# Patient Record
Sex: MALE | Race: WHITE
[De-identification: names, ages, dates, MRNs, and addresses within clinical notes are randomized per-mention and may not be internally consistent; named-entity substitution may affect disease eponyms.]

---

## 2018-05-17 PROBLEM — B07.9 WARTS OF FOOT: Status: ACTIVE | Noted: 2018-05-17

## 2018-07-05 PROCEDURE — 81003 URINALYSIS AUTO W/O SCOPE: CPT | Performed by: FAMILY MEDICINE

## 2020-01-08 ENCOUNTER — HOSPITAL (OUTPATIENT)
Dept: OTHER | Age: 40
End: 2020-01-08

## 2020-01-08 LAB
ANALYZER ANC (IANC): ABNORMAL
ANION GAP SERPL CALC-SCNC: 7 MMOL/L (ref 10–20)
BASOPHILS # BLD: 0.1 K/MCL (ref 0–0.3)
BASOPHILS NFR BLD: 1 %
BUN SERPL-MCNC: 16 MG/DL (ref 6–20)
BUN/CREAT SERPL: 17 (ref 7–25)
CALCIUM SERPL-MCNC: 9.2 MG/DL (ref 8.4–10.2)
CHLORIDE SERPL-SCNC: 107 MMOL/L (ref 98–107)
CO2 SERPL-SCNC: 29 MMOL/L (ref 21–32)
CREAT SERPL-MCNC: 0.95 MG/DL (ref 0.67–1.17)
DIFFERENTIAL METHOD BLD: ABNORMAL
EOSINOPHIL # BLD: 0.4 K/MCL (ref 0.1–0.5)
EOSINOPHIL NFR BLD: 6 %
ERYTHROCYTE [DISTWIDTH] IN BLOOD: 12.6 % (ref 11–15)
GLUCOSE SERPL-MCNC: 92 MG/DL (ref 65–99)
HCT VFR BLD CALC: 44.4 % (ref 39–51)
HGB BLD-MCNC: 14.9 G/DL (ref 13–17)
IMM GRANULOCYTES # BLD AUTO: 0 K/MCL (ref 0–0.2)
IMM GRANULOCYTES NFR BLD: 0 %
LYMPHOCYTES # BLD: 1.4 K/MCL (ref 1–4.8)
LYMPHOCYTES NFR BLD: 21 %
MAGNESIUM SERPL-MCNC: 2.4 MG/DL (ref 1.7–2.4)
MCH RBC QN AUTO: 30.8 PG (ref 26–34)
MCHC RBC AUTO-ENTMCNC: 33.6 G/DL (ref 32–36.5)
MCV RBC AUTO: 91.7 FL (ref 78–100)
MONOCYTES # BLD: 1.1 K/MCL (ref 0.3–0.9)
MONOCYTES NFR BLD: 16 %
NEUTROPHILS # BLD: 3.8 K/MCL (ref 1.8–7.7)
NEUTROPHILS NFR BLD: 56 %
NEUTS SEG NFR BLD: ABNORMAL %
NRBC (NRBCRE): 0 /100 WBC
PLATELET # BLD: 207 K/MCL (ref 140–450)
POTASSIUM SERPL-SCNC: 3.9 MMOL/L (ref 3.4–5.1)
PROCALCITONIN SERPL IA-MCNC: 0.09 NG/ML
PROCALCITONIN SERPL IA-MCNC: NORMAL NG/ML
RBC # BLD: 4.84 MIL/MCL (ref 4.5–5.9)
SODIUM SERPL-SCNC: 139 MMOL/L (ref 135–145)
WBC # BLD: 6.9 K/MCL (ref 4.2–11)

## 2020-01-12 ENCOUNTER — HOSPITAL ENCOUNTER (INPATIENT)
Facility: HOSPITAL | Age: 40
LOS: 3 days | Discharge: HOME | DRG: 122 | End: 2020-01-15
Attending: EMERGENCY MEDICINE | Admitting: INTERNAL MEDICINE
Payer: COMMERCIAL

## 2020-01-12 ENCOUNTER — APPOINTMENT (OUTPATIENT)
Dept: CT IMAGING | Facility: HOSPITAL | Age: 40
DRG: 122 | End: 2020-01-12
Attending: EMERGENCY MEDICINE
Payer: COMMERCIAL

## 2020-01-12 ENCOUNTER — APPOINTMENT (OUTPATIENT)
Dept: MRI IMAGING | Facility: HOSPITAL | Age: 40
DRG: 122 | End: 2020-01-12
Attending: EMERGENCY MEDICINE
Payer: COMMERCIAL

## 2020-01-12 DIAGNOSIS — H05.011 CELLULITIS OF RIGHT ORBITAL REGION: Primary | ICD-10-CM

## 2020-01-12 LAB
ALBUMIN SERPL-MCNC: 3.9 G/DL (ref 3.4–5)
ALBUMIN/GLOB SERPL: 1.1 {RATIO} (ref 1–2)
ALP LIVER SERPL-CCNC: 51 U/L (ref 45–117)
ALT SERPL-CCNC: 44 U/L (ref 16–61)
ANION GAP SERPL CALC-SCNC: 6 MMOL/L (ref 0–18)
AST SERPL-CCNC: 16 U/L (ref 15–37)
BASOPHILS # BLD AUTO: 0.09 X10(3) UL (ref 0–0.2)
BASOPHILS NFR BLD AUTO: 1.2 %
BILIRUB SERPL-MCNC: 0.3 MG/DL (ref 0.1–2)
BUN BLD-MCNC: 14 MG/DL (ref 7–18)
BUN/CREAT SERPL: 16.3 (ref 10–20)
CALCIUM BLD-MCNC: 9.3 MG/DL (ref 8.5–10.1)
CHLORIDE SERPL-SCNC: 109 MMOL/L (ref 98–112)
CO2 SERPL-SCNC: 27 MMOL/L (ref 21–32)
CREAT BLD-MCNC: 0.86 MG/DL (ref 0.7–1.3)
DEPRECATED RDW RBC AUTO: 41.9 FL (ref 35.1–46.3)
EOSINOPHIL # BLD AUTO: 0.43 X10(3) UL (ref 0–0.7)
EOSINOPHIL NFR BLD AUTO: 5.7 %
ERYTHROCYTE [DISTWIDTH] IN BLOOD BY AUTOMATED COUNT: 12.6 % (ref 11–15)
GLOBULIN PLAS-MCNC: 3.4 G/DL (ref 2.8–4.4)
GLUCOSE BLD-MCNC: 81 MG/DL (ref 70–99)
HCT VFR BLD AUTO: 43.7 % (ref 39–53)
HGB BLD-MCNC: 14.5 G/DL (ref 13–17.5)
IMM GRANULOCYTES # BLD AUTO: 0.02 X10(3) UL (ref 0–1)
IMM GRANULOCYTES NFR BLD: 0.3 %
LYMPHOCYTES # BLD AUTO: 2.48 X10(3) UL (ref 1–4)
LYMPHOCYTES NFR BLD AUTO: 32.8 %
M PROTEIN MFR SERPL ELPH: 7.3 G/DL (ref 6.4–8.2)
MCH RBC QN AUTO: 30.1 PG (ref 26–34)
MCHC RBC AUTO-ENTMCNC: 33.2 G/DL (ref 31–37)
MCV RBC AUTO: 90.9 FL (ref 80–100)
MONOCYTES # BLD AUTO: 0.54 X10(3) UL (ref 0.1–1)
MONOCYTES NFR BLD AUTO: 7.1 %
NEUTROPHILS # BLD AUTO: 4 X10 (3) UL (ref 1.5–7.7)
NEUTROPHILS # BLD AUTO: 4 X10(3) UL (ref 1.5–7.7)
NEUTROPHILS NFR BLD AUTO: 52.9 %
OSMOLALITY SERPL CALC.SUM OF ELEC: 294 MOSM/KG (ref 275–295)
PLATELET # BLD AUTO: 227 10(3)UL (ref 150–450)
POTASSIUM SERPL-SCNC: 3.9 MMOL/L (ref 3.5–5.1)
RBC # BLD AUTO: 4.81 X10(6)UL (ref 4.3–5.7)
SED RATE-ML: 4 MM/HR (ref 0–12)
SODIUM SERPL-SCNC: 142 MMOL/L (ref 136–145)
WBC # BLD AUTO: 7.6 X10(3) UL (ref 4–11)

## 2020-01-12 PROCEDURE — 99285 EMERGENCY DEPT VISIT HI MDM: CPT

## 2020-01-12 PROCEDURE — 96375 TX/PRO/DX INJ NEW DRUG ADDON: CPT

## 2020-01-12 PROCEDURE — A9575 INJ GADOTERATE MEGLUMI 0.1ML: HCPCS

## 2020-01-12 PROCEDURE — 70543 MRI ORBT/FAC/NCK W/O &W/DYE: CPT | Performed by: EMERGENCY MEDICINE

## 2020-01-12 PROCEDURE — 70482 CT ORBIT/EAR/FOSSA W/O&W/DYE: CPT | Performed by: EMERGENCY MEDICINE

## 2020-01-12 PROCEDURE — 96365 THER/PROPH/DIAG IV INF INIT: CPT

## 2020-01-12 PROCEDURE — 80053 COMPREHEN METABOLIC PANEL: CPT | Performed by: EMERGENCY MEDICINE

## 2020-01-12 PROCEDURE — 85652 RBC SED RATE AUTOMATED: CPT | Performed by: EMERGENCY MEDICINE

## 2020-01-12 PROCEDURE — 70553 MRI BRAIN STEM W/O & W/DYE: CPT | Performed by: EMERGENCY MEDICINE

## 2020-01-12 PROCEDURE — 85025 COMPLETE CBC W/AUTO DIFF WBC: CPT | Performed by: EMERGENCY MEDICINE

## 2020-01-12 PROCEDURE — 82787 IGG 1 2 3 OR 4 EACH: CPT | Performed by: OTHER

## 2020-01-12 RX ORDER — HYDROCODONE BITARTRATE AND ACETAMINOPHEN 10; 325 MG/1; MG/1
1 TABLET ORAL EVERY 4 HOURS PRN
Status: DISCONTINUED | OUTPATIENT
Start: 2020-01-12 | End: 2020-01-12

## 2020-01-12 RX ORDER — ONDANSETRON 2 MG/ML
4 INJECTION INTRAMUSCULAR; INTRAVENOUS EVERY 6 HOURS PRN
Status: DISCONTINUED | OUTPATIENT
Start: 2020-01-12 | End: 2020-01-15

## 2020-01-12 RX ORDER — BISACODYL 10 MG
10 SUPPOSITORY, RECTAL RECTAL
Status: DISCONTINUED | OUTPATIENT
Start: 2020-01-12 | End: 2020-01-15

## 2020-01-12 RX ORDER — KETOROLAC TROMETHAMINE 30 MG/ML
30 INJECTION, SOLUTION INTRAMUSCULAR; INTRAVENOUS ONCE
Status: COMPLETED | OUTPATIENT
Start: 2020-01-12 | End: 2020-01-12

## 2020-01-12 RX ORDER — KETOROLAC TROMETHAMINE 30 MG/ML
30 INJECTION, SOLUTION INTRAMUSCULAR; INTRAVENOUS EVERY 6 HOURS PRN
Status: DISPENSED | OUTPATIENT
Start: 2020-01-12 | End: 2020-01-14

## 2020-01-12 RX ORDER — ACETAMINOPHEN 325 MG/1
650 TABLET ORAL EVERY 6 HOURS PRN
Status: DISCONTINUED | OUTPATIENT
Start: 2020-01-12 | End: 2020-01-15

## 2020-01-12 RX ORDER — DOXYCYCLINE HYCLATE 100 MG/1
100 CAPSULE ORAL 2 TIMES DAILY
COMMUNITY
End: 2020-01-29

## 2020-01-12 RX ORDER — HYDROCODONE BITARTRATE AND ACETAMINOPHEN 5; 325 MG/1; MG/1
1 TABLET ORAL EVERY 4 HOURS PRN
Status: DISCONTINUED | OUTPATIENT
Start: 2020-01-12 | End: 2020-01-12

## 2020-01-12 RX ORDER — ENOXAPARIN SODIUM 100 MG/ML
40 INJECTION SUBCUTANEOUS NIGHTLY
Status: DISCONTINUED | OUTPATIENT
Start: 2020-01-12 | End: 2020-01-15

## 2020-01-12 RX ORDER — METOCLOPRAMIDE HYDROCHLORIDE 5 MG/ML
10 INJECTION INTRAMUSCULAR; INTRAVENOUS EVERY 8 HOURS PRN
Status: DISCONTINUED | OUTPATIENT
Start: 2020-01-12 | End: 2020-01-15

## 2020-01-12 RX ORDER — POLYETHYLENE GLYCOL 3350 17 G/17G
17 POWDER, FOR SOLUTION ORAL DAILY PRN
Status: DISCONTINUED | OUTPATIENT
Start: 2020-01-12 | End: 2020-01-15

## 2020-01-12 RX ORDER — TOBRAMYCIN AND DEXAMETHASONE 3; 1 MG/ML; MG/ML
1 SUSPENSION/ DROPS OPHTHALMIC 4 TIMES DAILY
Status: DISCONTINUED | OUTPATIENT
Start: 2020-01-12 | End: 2020-01-15

## 2020-01-13 LAB
ANION GAP SERPL CALC-SCNC: 6 MMOL/L (ref 0–18)
BASOPHILS # BLD AUTO: 0.08 X10(3) UL (ref 0–0.2)
BASOPHILS NFR BLD AUTO: 1.2 %
BUN BLD-MCNC: 14 MG/DL (ref 7–18)
BUN/CREAT SERPL: 15.6 (ref 10–20)
CALCIUM BLD-MCNC: 8.8 MG/DL (ref 8.5–10.1)
CHLORIDE SERPL-SCNC: 110 MMOL/L (ref 98–112)
CO2 SERPL-SCNC: 27 MMOL/L (ref 21–32)
CREAT BLD-MCNC: 0.9 MG/DL (ref 0.7–1.3)
DEPRECATED RDW RBC AUTO: 43.1 FL (ref 35.1–46.3)
EOSINOPHIL # BLD AUTO: 0.38 X10(3) UL (ref 0–0.7)
EOSINOPHIL NFR BLD AUTO: 5.9 %
ERYTHROCYTE [DISTWIDTH] IN BLOOD BY AUTOMATED COUNT: 12.6 % (ref 11–15)
GLUCOSE BLD-MCNC: 90 MG/DL (ref 70–99)
HCT VFR BLD AUTO: 44.1 % (ref 39–53)
HGB BLD-MCNC: 14.7 G/DL (ref 13–17.5)
IMM GRANULOCYTES # BLD AUTO: 0.01 X10(3) UL (ref 0–1)
IMM GRANULOCYTES NFR BLD: 0.2 %
LYMPHOCYTES # BLD AUTO: 2.48 X10(3) UL (ref 1–4)
LYMPHOCYTES NFR BLD AUTO: 38.3 %
MCH RBC QN AUTO: 30.9 PG (ref 26–34)
MCHC RBC AUTO-ENTMCNC: 33.3 G/DL (ref 31–37)
MCV RBC AUTO: 92.8 FL (ref 80–100)
MONOCYTES # BLD AUTO: 0.64 X10(3) UL (ref 0.1–1)
MONOCYTES NFR BLD AUTO: 9.9 %
NEUTROPHILS # BLD AUTO: 2.88 X10 (3) UL (ref 1.5–7.7)
NEUTROPHILS # BLD AUTO: 2.88 X10(3) UL (ref 1.5–7.7)
NEUTROPHILS NFR BLD AUTO: 44.5 %
OSMOLALITY SERPL CALC.SUM OF ELEC: 296 MOSM/KG (ref 275–295)
PLATELET # BLD AUTO: 230 10(3)UL (ref 150–450)
POTASSIUM SERPL-SCNC: 4.3 MMOL/L (ref 3.5–5.1)
RBC # BLD AUTO: 4.75 X10(6)UL (ref 4.3–5.7)
SODIUM SERPL-SCNC: 143 MMOL/L (ref 136–145)
WBC # BLD AUTO: 6.5 X10(3) UL (ref 4–11)

## 2020-01-13 PROCEDURE — 85025 COMPLETE CBC W/AUTO DIFF WBC: CPT | Performed by: HOSPITALIST

## 2020-01-13 PROCEDURE — 80048 BASIC METABOLIC PNL TOTAL CA: CPT | Performed by: HOSPITALIST

## 2020-01-13 RX ORDER — PANTOPRAZOLE SODIUM 40 MG/1
40 TABLET, DELAYED RELEASE ORAL
Status: DISCONTINUED | OUTPATIENT
Start: 2020-01-13 | End: 2020-01-15

## 2020-01-13 RX ORDER — PANTOPRAZOLE SODIUM 40 MG/1
40 TABLET, DELAYED RELEASE ORAL
Status: DISCONTINUED | OUTPATIENT
Start: 2020-01-13 | End: 2020-01-13

## 2020-01-13 NOTE — ED PROVIDER NOTES
Patient Seen in: [facility] 7ne-a      History   Patient presents with:   Eye Visual Problem    Stated Complaint: orbital cellulitis, vision changes    HPI    Patient is a 53-year-old male comes emergency room for evaluation of visual problems from h O2 Device None (Room air)       Current:/68 (BP Location: Left arm)   Pulse 80   Temp 97.7 °F (36.5 °C) (Oral)   Resp 18   Ht 180.3 cm (5' 11\")   Wt 93 kg   SpO2 99%   BMI 28.59 kg/m²     Right Eye Chart Acuity: 20/40, Uncorrected  Left Eye Chart CBC WITH DIFFERENTIAL WITH PLATELET   BASIC METABOLIC PANEL (8)   RAINBOW DRAW BLUE   RAINBOW DRAW LAVENDER   RAINBOW DRAW LIGHT GREEN   RAINBOW DRAW GOLD   CBC W/ DIFFERENTIAL          Ct Orbits (w+wo) (cpt=70482)    Result Date: 1/12/2020  PROCEDURE: be seen with infectious etiology or inflammatory etiology such as pseudotumor. Correlation with clinical history is recommended. Other etiologies should be considered if the swelling does not respond to antibiotics or steroids.    Dictated by: Jose Oneal gland. OPTIC NERVES/CHAISM:      Negative. No mass or abnormal signal. INTRACONAL SPACES:  Negative. Normal retrobulbar fat. EXTRACONAL SPACES:   Enlargement of the like lacrimal gland with increased enhancement. CAVERNOUS SINUS:     Normal appearance. POA    * (Principal) Cellulitis of right orbital region H05.011 1/12/2020 Unknown

## 2020-01-13 NOTE — PLAN OF CARE
Assumed care at 0700  R eyelid edematous. Denies vision changes  C/o mild pain to R eye and HA. Controlled with toradol  IV steroids initiated. Pt c/o facial flushing and feeling warm after administration. Resolved after about an hour.  Denies itching and d

## 2020-01-13 NOTE — CONSULTS
INFECTIOUS DISEASE CONSULT NOTE    Adalberto Paredes Patient Status:  Inpatient    1980 MRN [id number]   [facility] 7NE-A Attending Deepti Desir MD   Hosp Day # 1 PCP Elizabeth Verdugo 1.0 - 2.0 standard drinks of alcohol per week. He reports that he does not use drugs.     Allergies:  No Known Allergies    Medications:    Current Facility-Administered Medications:   •  methylPREDNISolone Sodium Succ (Solu-MEDROL) 500 mg in sodium chlorid bleeding  Musculoskeletal: Negative for myalgias, arthralgias, arthritis. Neurological: Negative for headaches, dizziness, focal neuro deficits  Behavioral/Psych: Negative for anxiety or depression    Physical Exam:    General: No acute distress.  Alert an scanning is performed through the orbits. 3D shaded surface renderings and MPR's are generated on an independent CT scanner workstation. Dose reduction techniques were used.  Dose information is transmitted to the [facility] [employer]) Maame Clayton None.  INDICATIONS:  orbital cellulitis, vision changes  TECHNIQUE:  MRI of the brain was performed with multi-planar T1, T2-weighted images with FLAIR sequences and diffusion weighted images without and with infusion.   MRI of the orbits was performed with the setting of external infection. This would be difficult to distinguish from inflammatory pseudotumor . If there is no response from antibiotics or steroids, the last possibility would be lymphoma test inflammation.   Findings were discussed with Dr. Lajune Rubinstein

## 2020-01-13 NOTE — H&P
[facility] Hospitalist H&P       CC: Patient presents with: Eye Visual Problem       PCP: Amanda Zepeda DO    History of Present Illness:  Mr. Sukhi Moran is a 38 yo male without significant PMH who presented to the ED with right eye vision issues.  Symptoms starte Disorder Father         Aortic valvular disease       Review of Systems  12 point ROS negative, except as stated in HPI    OBJECTIVE:  /66 (BP Location: Left arm)   Pulse 55   Temp 98.1 °F (36.7 °C) (Oral)   Resp 18   Ht 180.3 cm (5' 11\")   Wt 205 l is performed through the orbits. 3D shaded surface renderings and MPR's are generated on an independent CT scanner workstation. Dose reduction techniques were used.  Dose information is transmitted to the [facility] [employer] of Radiology) Maame [address] INDICATIONS:  orbital cellulitis, vision changes  TECHNIQUE:  MRI of the brain was performed with multi-planar T1, T2-weighted images with FLAIR sequences and diffusion weighted images without and with infusion.   MRI of the orbits was performed with thin s setting of external infection. This would be difficult to distinguish from inflammatory pseudotumor . If there is no response from antibiotics or steroids, the last possibility would be lymphoma test inflammation.   Findings were discussed with Dr. Jere Paniagua

## 2020-01-13 NOTE — PLAN OF CARE
Assumed care of pt at 2100. Pt AOx4. RA.  .  NSR. Pt c/o pain 2/10 to right eye orbit. Right eye orbit swollen. Toradol IV given in ER for relief. CT and MRI completed. IV ABX started and tobramycin eye gtts. Will continue to monitor.

## 2020-01-13 NOTE — CONSULTS
Neurology consult NOTE    The reason for consultation:    Left eye pain and ptosis. HPI:   Patient is a 49-year-old male who was admitted due to right eyelid droop, swelling and pain.  Patient complained of some mild upper eyelid swelling and was seen ini file      Transportation needs:        Medical: Not on file        Non-medical: Not on file    Tobacco Use      Smoking status: Never Smoker      Smokeless tobacco: Never Used    Substance and Sexual Activity      Alcohol use:  Yes        Alcohol/week: 1.0 CARDIOVASCULAR: denies chest pain or RAM; no palpitations   GI: denies nausea, vomiting, constipation, diarrhea; no rectal bleeding; no heartburn.   MUSCULOSKELETAL: no joint complaints upper or lower extremities  PSYCHE:no depression or anxiety  NEURO: m BUN 14 01/12/2020    BUN 20 07/05/2018     Lab Results   Component Value Date    CREATSERUM 0.90 01/13/2020    CREATSERUM 0.86 01/12/2020    CREATSERUM 0.87 07/05/2018     CBC:    Lab Results   Component Value Date    WBC 6.5 01/13/2020    WBC 7.6 01/12/20 orbital inflammatory syndrome favored than infection. Other ddx including not limiting ocular MG, Ig G4 disease. PLAN:     1. Start on IV solumedrol 500mg q 12 hrs and see if helping his symptoms. 2. Continue antibiotics per ID.    3. Pending MG and

## 2020-01-14 LAB
ANION GAP SERPL CALC-SCNC: 9 MMOL/L (ref 0–18)
BUN BLD-MCNC: 13 MG/DL (ref 7–18)
BUN/CREAT SERPL: 14.1 (ref 10–20)
CALCIUM BLD-MCNC: 8.9 MG/DL (ref 8.5–10.1)
CHLORIDE SERPL-SCNC: 108 MMOL/L (ref 98–112)
CO2 SERPL-SCNC: 24 MMOL/L (ref 21–32)
CREAT BLD-MCNC: 0.92 MG/DL (ref 0.7–1.3)
GLUCOSE BLD-MCNC: 168 MG/DL (ref 70–99)
OSMOLALITY SERPL CALC.SUM OF ELEC: 296 MOSM/KG (ref 275–295)
POTASSIUM SERPL-SCNC: 4.3 MMOL/L (ref 3.5–5.1)
SODIUM SERPL-SCNC: 141 MMOL/L (ref 136–145)

## 2020-01-14 PROCEDURE — 80048 BASIC METABOLIC PNL TOTAL CA: CPT | Performed by: INTERNAL MEDICINE

## 2020-01-14 NOTE — PLAN OF CARE
Patient alert and oriented times four. Meds given per MAR. New IV placed due to incompatibility between IV medications. Vital signs stable. Up ad kaye. Eye drops administered per orders. PRN Toradol for pain. Resting comfortably in bed. Call light in reach.

## 2020-01-14 NOTE — CM/SW NOTE
Care coordination note    Ophthalmology consult order was placed on 1/12/20. Call placed to Dr Denilson Roland [phone]. Spoke to the office, alerting them to the consult order & request to see the pt today. The message will be sent to the physician.  CM contac

## 2020-01-14 NOTE — PROGRESS NOTES
INFECTIOUS DISEASE PROGRESS NOTE    Renata Pang Patient Status:  Inpatient    1980 MRN [id number]   [facility] 7NE-A Attending Ralph Dejesus MD   Hosp Day # 2 PCP Ellena Kawasaki scab on occipital area at site of cyst resection. No signs of infection  Neck: No lymphadenopathy. Supple. Respiratory: Clear to auscultation bilaterally. No wheezes. No rhonchi. Cardiovascular: S1, S2.  Regular rate and rhythm. No murmurs.   Abdomen: 350  FINDINGS:  GLOBES:  There is mild right eye proptosis. The globe is unremarkable. EXTRAOCULAR MUSCLES:  There is enlargement of the superior rectus muscle with extension to the lacrimal gland which is better seen on the MRI.  INTRACONAL SPACE:  No vi Dotarem  FINDINGS:  MRI BRAIN INTRACRANIAL:  There are no focal parenchymal brain abnormalities. Diffusion weighted imaging was performed and is unremarkable. There is no evidence for acute infarction. There is no evidence of hemorrhage or mass lesion. steroids seem more c/w orbital pseudotumor or idiopathic orbital inflammatory syndrome rather than an infection. - no cellulitis on exam, is afebrile and w/o other systemic signs of infection. No acute sinusitis per imaging.  Based on above will d/c unasy

## 2020-01-14 NOTE — PROGRESS NOTES
Neurology follow up NOTE    SUBJECTIVE:  He has received IV solumedrol 1000mg and felt much better, he did not sleep well last night and had mild diffuse headaches. No blurry vision, no eye pain.      OBJECTIVE:   /88 (BP Location: Left arm)   Pulse 9 Stance/Romberg: Neg. SPEECH:     Articulation: NL     Rhythm: NL    REFLEXES:     RUE: 2+/4     RLE: 2+/4     LUE: 2+/4     LLE: 2+/4     ASSESSMENT:   39 presented with right eye selling, ptosis and pain.  MRI of orbit did show muscle enhancement, infec

## 2020-01-14 NOTE — PAYOR COMM NOTE
--------------  CONTINUED STAY REVIEW    Payor: [employer] #:  [id number]  Authorization Number: [id number]    Admit date: 1/12/20  Admit time: 2038    Admitting Physician: Marthenia Goodell, MD  Attending Physician:  Jeffry Faustin 1/14/2020 1315 Given 1 drop Right Eye Adam Awan RN    1/14/2020 0694 Given 1 drop Right Eye Adam Awan RN    1/13/2020 2035 Given 1 drop Right Eye Anup Shannon RN    1/13/2020 1745 Given 1 drop Right Eye Timothy Clement RN          Andreina Hearing: NL     Palate: NL     SCM/Trapezius: 5/5     Tongue in the middle     MOTOR FUNCTION:     Tone: NL     Bulk: NL     Strength: NL     Abnormal Movement: None     SENSORY FUNCTION:     Pin Prick: NL     Temperature: NL     Soft Touch: NL     Vibr

## 2020-01-14 NOTE — PAYOR COMM NOTE
--------------  ADMISSION REVIEW     Payor: Lata Teresa #:  [id number]  Authorization Number: [id number]    Admit date: 1/12/20  Admit time: 2038       Admitting Physician: Ambrose Walker MD  Attending Physician:  Adelita Grady MD for stated complaint: orbital cellulitis, vision changes  Other systems are as noted in HPI. Constitutional and vital signs reviewed. All other systems reviewed and negative except as noted above.     Physical Exam     ED Triage Vitals [01/12/20 1611] DIFFERENTIAL      Ct Orbits (w+wo) (cpt=70482)  Result Date: 1/12/2020  PROCEDURE:  CT ORBITS (W+WO) (CPT=70482)  COMPARISON:  [facility] , MR, MRI BRAIN+ORBITS (ALL W+WO) (CPT=70553/65109), 1/12/2020, 17:50.   INDICATIONS:  orbital cellulitis, vision changes the swelling does not respond to antibiotics or steroids. Mri Brain+orbits (all W+wo) (NOP=94470/29289)  Result Date: 1/12/2020  PROCEDURE:  MRI BRAIN+ORBITS (ALL W+WO) (CPT=70553/82073)  COMPARISON:  None.   INDICATIONS:  orbital cellulitis, visio not involve the cavernous sinus. CONCLUSION:   1. Enlargement and avid enhancement of the right superior rectus muscle and lacrimal gland. The differential diagnosis would include orbital cellulitis in the setting of external infection.   This would be Illness:  Mr. Valerie Carrillo is a 36 yo male without significant PMH who presented to the ED with right eye vision issues. Symptoms started last week, initially noted to have some swelling in the right upper eyelid.  He initially presented to the ED at [facility] 114/66  01/09/20 : 122/64  12/16/19 : 118/72    Wt Readings from Last 3 Encounters:  01/12/20 : 205 lb (93 kg)  01/12/20 : 205 lb (93 kg)  01/09/20 : 200 lb (90.7 kg)      Wt Readings from Last 6 Encounters:  01/12/20 : 205 lb (93 kg)  01/12/20 : 205 lb (9 PATIENT STATED HISTORY:(As transcribed by Technologist)  Patient complains of swelling around his right eye with blurry vision. CONTRAST USED:  74cc of Omnipaque 350  FINDINGS:  GLOBES:  There is mild right eye proptosis. The globe is unremarkable.   EX since last Tuesday. CONTRAST USED:  20 mL of Dotarem  FINDINGS:  MRI BRAIN INTRACRANIAL:  There are no focal parenchymal brain abnormalities. Diffusion weighted imaging was performed and is unremarkable. There is no evidence for acute infarction.   Ther and Swelling  - Concern for possible orbital cellulitis vs possible pseudotumor  - MRI orbit and CT orbit reviewed -- concern for orbital cellulitis vs possible pseudotumor  - Discussed with Dr. Darby Camacho and Dr. Jenn Patel -- more concern for inflammatory pseudotu respond to steroids. Will also cont empiric unasyn for now. Case and plan d/w Dr Ely Nice and with pt. Thank you for allowing me to participate in the care of this patient. Please do not hesitate to call if you have any questions.    I will continue to follo Route User    1/13/2020 1006 Given 40 mg Oral Andreina Fletcher RN      tobramycin-dexamethasone (TOBRADEX) 0.3-0.1 % ophthalmic suspension 1 drop     Date Action Dose Route User    1/13/2020 1745 Given 1 drop Right Eye Tyler Mar RN    1/13/2020 6631

## 2020-01-14 NOTE — PROGRESS NOTES
[facility] Hospitalist Progress Note     [facility]      SUBJECTIVE:  Feeling much better today   Eye swelling better    OBJECTIVE:  Temp:  [97.6 °F (36.4 °C)-98.3 °F (36.8 °C)] 97.8 °F (36.6 °C)  Pulse:  [68-94] 94  Resp:  [16-20] 18  BP: (108-145)/(60-88 HISTORY:(As transcribed by Technologist)  Patient complains of swelling around his right eye with blurry vision. CONTRAST USED:  74cc of Omnipaque 350  FINDINGS:  GLOBES:  There is mild right eye proptosis. The globe is unremarkable.   EXTRAOCULAR MUSCLE transcribed by Technologist)  Patient presents to the ER with pain and swelling to the right eye since last Tuesday. CONTRAST USED:  20 mL of Dotarem  FINDINGS:  MRI BRAIN INTRACRANIAL:  There are no focal parenchymal brain abnormalities.   Diffusion weig Approved by: Angella Landeros MD on 1/12/2020 at 19:16             Meds:   No current outpatient medications on file.       methylPREDNISolone Sodium Succ (Solu-MEDROL) 500 mg in sodium chloride 0.9% 100 mL IVPB, 500 mg, Intravenous, Once  [START ON 1/15/2020] afternoon     Prophy:  DVT: lovenox  Deconditioning prevention: PT     Dispo: admitted for right eye ptosis     Updated patient and wife at bedside on plan of care, also discussed with RN [address]  Internal Medicine  [facility]

## 2020-01-15 VITALS
HEIGHT: 71 IN | HEART RATE: 80 BPM | OXYGEN SATURATION: 97 % | TEMPERATURE: 98 F | BODY MASS INDEX: 28.7 KG/M2 | RESPIRATION RATE: 16 BRPM | DIASTOLIC BLOOD PRESSURE: 67 MMHG | WEIGHT: 205 LBS | SYSTOLIC BLOOD PRESSURE: 124 MMHG

## 2020-01-15 LAB
ANION GAP SERPL CALC-SCNC: 2 MMOL/L (ref 0–18)
BUN BLD-MCNC: 18 MG/DL (ref 7–18)
BUN/CREAT SERPL: 21.4 (ref 10–20)
CALCIUM BLD-MCNC: 8.6 MG/DL (ref 8.5–10.1)
CHLORIDE SERPL-SCNC: 110 MMOL/L (ref 98–112)
CO2 SERPL-SCNC: 29 MMOL/L (ref 21–32)
CREAT BLD-MCNC: 0.84 MG/DL (ref 0.7–1.3)
GLUCOSE BLD-MCNC: 144 MG/DL (ref 70–99)
IMMUNOGLOBULIN G SUBCLASS 1: 518 MG/DL
IMMUNOGLOBULIN G SUBCLASS 2: 266 MG/DL
IMMUNOGLOBULIN G SUBCLASS 3: 22 MG/DL
IMMUNOGLOBULIN G SUBCLASS 4: 51 MG/DL
OSMOLALITY SERPL CALC.SUM OF ELEC: 296 MOSM/KG (ref 275–295)
POTASSIUM SERPL-SCNC: 4.4 MMOL/L (ref 3.5–5.1)
SODIUM SERPL-SCNC: 141 MMOL/L (ref 136–145)

## 2020-01-15 PROCEDURE — 80048 BASIC METABOLIC PNL TOTAL CA: CPT | Performed by: INTERNAL MEDICINE

## 2020-01-15 RX ORDER — PREDNISONE 20 MG/1
80 TABLET ORAL DAILY
Qty: 28 TABLET | Refills: 0 | Status: SHIPPED | OUTPATIENT
Start: 2020-01-15 | End: 2021-01-04 | Stop reason: ALTCHOICE

## 2020-01-15 RX ORDER — PREDNISONE 20 MG/1
TABLET ORAL
Qty: 30 TABLET | Refills: 0 | Status: SHIPPED | OUTPATIENT
Start: 2020-01-15 | End: 2020-01-15

## 2020-01-15 RX ORDER — PANTOPRAZOLE SODIUM 40 MG/1
40 TABLET, DELAYED RELEASE ORAL
Qty: 30 TABLET | Refills: 0 | Status: SHIPPED | OUTPATIENT
Start: 2020-01-16 | End: 2021-01-04 | Stop reason: ALTCHOICE

## 2020-01-15 NOTE — PROGRESS NOTES
Neurology follow up NOTE    SUBJECTIVE:  Eye pain and swelling continue improving.      OBJECTIVE:   /67 (BP Location: Left arm)   Pulse 80   Temp 97.8 °F (36.6 °C) (Oral)   Resp 16   Ht 5' 11\" (1.803 m)   Wt 205 lb (93 kg)   SpO2 97%   BMI 28.59 kg/ LUE: 2+/4     LLE: 2+/4     ASSESSMENT:   39 presented with right eye selling, ptosis and pain. MRI of orbit did show muscle enhancement, infection vs inflammation.  Ithe orbital pseudotumor or idiopathic orbital inflammatory syndrome favored than infectio

## 2020-01-15 NOTE — PROGRESS NOTES
INFECTIOUS DISEASE PROGRESS NOTE    Renata Pang Patient Status:  Inpatient    1980 MRN [id number]   [facility] 7NE-A Attending Ralph Dejesus MD   Hosp Day # 3 PCP Ellena Kawasaki murmurs. Abdomen: Soft, nontender, nondistended. Positive bowel sounds. Musculoskeletal: Full range of motion of all extremities. No swelling noted. Integument: No lesions. No erythema. No open wounds.     Laboratory Data:    Recent Labs   Lab 01/12/20 rectus muscle with extension to the lacrimal gland which is better seen on the MRI. INTRACONAL SPACE:  No visible mass, with normal retrobulbar fat. EXTRACONAL SPACE:  No visible mass. SINUSES:  No significant mucosal thickening or fluid.   Mucous retenti unremarkable. There is no evidence for acute infarction. There is no evidence of hemorrhage or mass lesion. VENTRICLES/SULCI:   Ventricles and sulci are normal in caliber. There are no extra-axial fluid collections. There is no midline shift.  SINUSES: afebrile and w/o other systemic signs of infection. No acute sinusitis per imaging. He cont to improve off abx  - cont steroids per neuro and ophthalmology  - He will need f/u with ophthalmology as outpt for further management. Case and plan d/w pt.  Josesito Culver

## 2020-01-15 NOTE — DISCHARGE SUMMARY
General Medicine Discharge Summary     Patient ID:  Fabi Paz  37year old  4/11/1980    Admit date: 1/12/2020    Discharge date and time: 1/15/2020  1:21 PM     Attending Physician: Mariama Wilkerson MD    Primary Care Physician: Toan Weinstein DO Suspension  Place 1 drop into the right eye 4 (four) times daily for 10 days. , Normal, Disp-1 Bottle, R-0        Follow-up with   PCP in 1 week  Ophthalmology tomorrow  Neurology in 1 month     Total Time Coordinating Care: Greater than 30 minutes    Patie

## 2020-01-15 NOTE — PLAN OF CARE
Assumed care of patient at 1900. Alert and oriented x4. No complaints of pain at this time. Minimal swelling around right eye. Room air with clear lung sounds. Ambulatory in room. VSS. Tylenol for pain if needed.  Call light within reach, will continue to m

## 2020-01-15 NOTE — PLAN OF CARE
Problem: Patient/Family Goals  Goal: Patient/Family Long Term Goal  Description  Patient's Long Term Goal: Keep inflammation down    Interventions:  - Take tapering dose of medication  - See additional Care Plan goals for specific interventions  Outcome:

## 2020-01-15 NOTE — PLAN OF CARE
NURSING DISCHARGE NOTE    Discharged Home via Ambulatory. Accompanied by Support staff  Belongings Taken by patient/family. Patient was discharged to home with new prescriptions. Discussed follow up care and new medications. Stated understanding.  I

## 2020-01-17 NOTE — PAYOR COMM NOTE
--------------  DISCHARGE REVIEW    Payor: Janes Collier #:  [id number]  Authorization Number: [id number]    Admit date: 1/12/20  Admit time:  2038  Discharge Date: 1/15/2020  1:21 PM     Admitting Physician: Danita Ibarra MD  Attendi afternoon    Consults: IP CONSULT TO INFECTIOUS DISEASE    Operative Procedures:      Disposition: home    Patient Instructions:   Discharge Medication List as of 1/15/2020 11:43 AM    START taking these medications    Pantoprazole Sodium 40 MG Oral Tab EC

## 2020-01-29 PROBLEM — H05.011 CELLULITIS OF RIGHT ORBITAL REGION: Status: RESOLVED | Noted: 2020-01-12 | Resolved: 2020-01-29

## 2021-09-07 ENCOUNTER — TELEPHONE (OUTPATIENT)
Dept: ALLERGY | Facility: CLINIC | Age: 41
End: 2021-09-07

## 2021-09-07 NOTE — TELEPHONE ENCOUNTER
Erroneous encounter. Magdalena Thapa is not a patient of Dr. Jigna Ocampo. He was reaching out on a professional level.

## 2021-09-07 NOTE — TELEPHONE ENCOUNTER
This pt states that he has spoke with Dr Rosi Goodman in the past and that he is a . Pt states he usually calls our number to set up an appt with . He states there is a New therapy treatment that has been published and states that  wanted him to reac

## 2021-09-29 PROBLEM — H18.609: Status: ACTIVE | Noted: 2021-09-29

## 2021-11-01 NOTE — ED INITIAL ASSESSMENT (HPI)
Pt c/o of swelling and pain to R eye x 6 days. Was seen in ED and given tobramycin drops and taking oral antibiotics Pt states no improvement. Wife if a hospitalist here and advised him to come in to be further evaluated.  Pt states he has had blurriness to - - -

## 2022-11-28 ENCOUNTER — LAB ENCOUNTER (OUTPATIENT)
Dept: LAB | Age: 42
End: 2022-11-28
Attending: OTOLARYNGOLOGY
Payer: COMMERCIAL

## 2022-11-28 DIAGNOSIS — Z20.822 ENCOUNTER FOR PREPROCEDURE SCREENING LABORATORY TESTING FOR COVID-19: ICD-10-CM

## 2022-11-28 DIAGNOSIS — Z01.812 ENCOUNTER FOR PREPROCEDURE SCREENING LABORATORY TESTING FOR COVID-19: ICD-10-CM

## 2022-11-28 RX ORDER — FAMOTIDINE 40 MG/1
40 TABLET, FILM COATED ORAL DAILY
COMMUNITY

## 2022-11-29 ENCOUNTER — ANESTHESIA EVENT (OUTPATIENT)
Dept: SURGERY | Facility: HOSPITAL | Age: 42
End: 2022-11-29
Payer: COMMERCIAL

## 2022-11-29 LAB — SARS-COV-2 RNA RESP QL NAA+PROBE: NOT DETECTED

## 2022-11-30 ENCOUNTER — HOSPITAL ENCOUNTER (OUTPATIENT)
Facility: HOSPITAL | Age: 42
Setting detail: HOSPITAL OUTPATIENT SURGERY
Discharge: HOME | End: 2022-11-30
Attending: OTOLARYNGOLOGY | Admitting: OTOLARYNGOLOGY
Payer: COMMERCIAL

## 2022-11-30 ENCOUNTER — ANESTHESIA (OUTPATIENT)
Dept: SURGERY | Facility: HOSPITAL | Age: 42
End: 2022-11-30
Payer: COMMERCIAL

## 2022-11-30 VITALS
DIASTOLIC BLOOD PRESSURE: 90 MMHG | OXYGEN SATURATION: 97 % | WEIGHT: 195 LBS | RESPIRATION RATE: 16 BRPM | TEMPERATURE: 97 F | HEIGHT: 71 IN | BODY MASS INDEX: 27.3 KG/M2 | SYSTOLIC BLOOD PRESSURE: 143 MMHG | HEART RATE: 75 BPM

## 2022-11-30 DIAGNOSIS — Z01.812 ENCOUNTER FOR PREPROCEDURE SCREENING LABORATORY TESTING FOR COVID-19: Primary | ICD-10-CM

## 2022-11-30 DIAGNOSIS — Z20.822 ENCOUNTER FOR PREPROCEDURE SCREENING LABORATORY TESTING FOR COVID-19: Primary | ICD-10-CM

## 2022-11-30 PROCEDURE — 88311 DECALCIFY TISSUE: CPT | Performed by: OTOLARYNGOLOGY

## 2022-11-30 PROCEDURE — 099R8ZZ DRAINAGE OF LEFT MAXILLARY SINUS, VIA NATURAL OR ARTIFICIAL OPENING ENDOSCOPIC: ICD-10-PCS | Performed by: OTOLARYNGOLOGY

## 2022-11-30 PROCEDURE — 88304 TISSUE EXAM BY PATHOLOGIST: CPT | Performed by: OTOLARYNGOLOGY

## 2022-11-30 PROCEDURE — 099Q8ZZ DRAINAGE OF RIGHT MAXILLARY SINUS, VIA NATURAL OR ARTIFICIAL OPENING ENDOSCOPIC: ICD-10-PCS | Performed by: OTOLARYNGOLOGY

## 2022-11-30 PROCEDURE — 09TL8ZZ RESECTION OF NASAL TURBINATE, VIA NATURAL OR ARTIFICIAL OPENING ENDOSCOPIC: ICD-10-PCS | Performed by: OTOLARYNGOLOGY

## 2022-11-30 RX ORDER — SODIUM CHLORIDE, SODIUM LACTATE, POTASSIUM CHLORIDE, CALCIUM CHLORIDE 600; 310; 30; 20 MG/100ML; MG/100ML; MG/100ML; MG/100ML
INJECTION, SOLUTION INTRAVENOUS CONTINUOUS
Status: DISCONTINUED | OUTPATIENT
Start: 2022-11-30 | End: 2022-11-30

## 2022-11-30 RX ORDER — HYDROMORPHONE HYDROCHLORIDE 1 MG/ML
0.6 INJECTION, SOLUTION INTRAMUSCULAR; INTRAVENOUS; SUBCUTANEOUS EVERY 5 MIN PRN
Status: DISCONTINUED | OUTPATIENT
Start: 2022-11-30 | End: 2022-11-30

## 2022-11-30 RX ORDER — ACETAMINOPHEN 500 MG
1000 TABLET ORAL ONCE
Status: DISCONTINUED | OUTPATIENT
Start: 2022-11-30 | End: 2022-11-30 | Stop reason: HOSPADM

## 2022-11-30 RX ORDER — HYDROMORPHONE HYDROCHLORIDE 1 MG/ML
0.2 INJECTION, SOLUTION INTRAMUSCULAR; INTRAVENOUS; SUBCUTANEOUS EVERY 5 MIN PRN
Status: DISCONTINUED | OUTPATIENT
Start: 2022-11-30 | End: 2022-11-30

## 2022-11-30 RX ORDER — AZELASTINE HYDROCHLORIDE 137 UG/1
SPRAY, METERED NASAL
COMMUNITY
Start: 2022-11-27

## 2022-11-30 RX ORDER — ONDANSETRON 2 MG/ML
4 INJECTION INTRAMUSCULAR; INTRAVENOUS EVERY 6 HOURS PRN
Status: DISCONTINUED | OUTPATIENT
Start: 2022-11-30 | End: 2022-11-30

## 2022-11-30 RX ORDER — OXYMETAZOLINE HYDROCHLORIDE 0.05 G/100ML
SPRAY NASAL AS NEEDED
Status: DISCONTINUED | OUTPATIENT
Start: 2022-11-30 | End: 2022-11-30 | Stop reason: HOSPADM

## 2022-11-30 RX ORDER — HYDROCODONE BITARTRATE AND ACETAMINOPHEN 5; 325 MG/1; MG/1
2 TABLET ORAL ONCE AS NEEDED
Status: DISCONTINUED | OUTPATIENT
Start: 2022-11-30 | End: 2022-11-30

## 2022-11-30 RX ORDER — DEXAMETHASONE SODIUM PHOSPHATE 4 MG/ML
VIAL (ML) INJECTION AS NEEDED
Status: DISCONTINUED | OUTPATIENT
Start: 2022-11-30 | End: 2022-11-30 | Stop reason: SURG

## 2022-11-30 RX ORDER — HYDROMORPHONE HYDROCHLORIDE 1 MG/ML
0.4 INJECTION, SOLUTION INTRAMUSCULAR; INTRAVENOUS; SUBCUTANEOUS EVERY 5 MIN PRN
Status: DISCONTINUED | OUTPATIENT
Start: 2022-11-30 | End: 2022-11-30

## 2022-11-30 RX ORDER — SCOLOPAMINE TRANSDERMAL SYSTEM 1 MG/1
1 PATCH, EXTENDED RELEASE TRANSDERMAL ONCE
Status: DISCONTINUED | OUTPATIENT
Start: 2022-11-30 | End: 2022-11-30 | Stop reason: HOSPADM

## 2022-11-30 RX ORDER — HYDROCODONE BITARTRATE AND ACETAMINOPHEN 5; 325 MG/1; MG/1
1 TABLET ORAL ONCE AS NEEDED
Status: DISCONTINUED | OUTPATIENT
Start: 2022-11-30 | End: 2022-11-30

## 2022-11-30 RX ORDER — MIDAZOLAM HYDROCHLORIDE 1 MG/ML
1 INJECTION INTRAMUSCULAR; INTRAVENOUS EVERY 5 MIN PRN
Status: DISCONTINUED | OUTPATIENT
Start: 2022-11-30 | End: 2022-11-30

## 2022-11-30 RX ORDER — ACETAMINOPHEN 500 MG
1000 TABLET ORAL ONCE AS NEEDED
Status: DISCONTINUED | OUTPATIENT
Start: 2022-11-30 | End: 2022-11-30

## 2022-11-30 RX ORDER — LIDOCAINE HYDROCHLORIDE AND EPINEPHRINE 10; 10 MG/ML; UG/ML
INJECTION, SOLUTION INFILTRATION; PERINEURAL AS NEEDED
Status: DISCONTINUED | OUTPATIENT
Start: 2022-11-30 | End: 2022-11-30 | Stop reason: HOSPADM

## 2022-11-30 RX ORDER — LIDOCAINE HYDROCHLORIDE 10 MG/ML
INJECTION, SOLUTION EPIDURAL; INFILTRATION; INTRACAUDAL; PERINEURAL AS NEEDED
Status: DISCONTINUED | OUTPATIENT
Start: 2022-11-30 | End: 2022-11-30 | Stop reason: SURG

## 2022-11-30 RX ORDER — ROCURONIUM BROMIDE 10 MG/ML
INJECTION, SOLUTION INTRAVENOUS AS NEEDED
Status: DISCONTINUED | OUTPATIENT
Start: 2022-11-30 | End: 2022-11-30 | Stop reason: SURG

## 2022-11-30 RX ORDER — ONDANSETRON 2 MG/ML
INJECTION INTRAMUSCULAR; INTRAVENOUS AS NEEDED
Status: DISCONTINUED | OUTPATIENT
Start: 2022-11-30 | End: 2022-11-30 | Stop reason: SURG

## 2022-11-30 RX ORDER — PROCHLORPERAZINE EDISYLATE 5 MG/ML
5 INJECTION INTRAMUSCULAR; INTRAVENOUS EVERY 8 HOURS PRN
Status: DISCONTINUED | OUTPATIENT
Start: 2022-11-30 | End: 2022-11-30

## 2022-11-30 RX ORDER — NALOXONE HYDROCHLORIDE 0.4 MG/ML
80 INJECTION, SOLUTION INTRAMUSCULAR; INTRAVENOUS; SUBCUTANEOUS AS NEEDED
Status: DISCONTINUED | OUTPATIENT
Start: 2022-11-30 | End: 2022-11-30

## 2022-11-30 RX ORDER — KETOROLAC TROMETHAMINE 30 MG/ML
INJECTION, SOLUTION INTRAMUSCULAR; INTRAVENOUS AS NEEDED
Status: DISCONTINUED | OUTPATIENT
Start: 2022-11-30 | End: 2022-11-30 | Stop reason: SURG

## 2022-11-30 RX ADMIN — ROCURONIUM BROMIDE 40 MG: 10 INJECTION, SOLUTION INTRAVENOUS at 11:09:00

## 2022-11-30 RX ADMIN — SODIUM CHLORIDE, SODIUM LACTATE, POTASSIUM CHLORIDE, CALCIUM CHLORIDE: 600; 310; 30; 20 INJECTION, SOLUTION INTRAVENOUS at 11:54:00

## 2022-11-30 RX ADMIN — SODIUM CHLORIDE, SODIUM LACTATE, POTASSIUM CHLORIDE, CALCIUM CHLORIDE: 600; 310; 30; 20 INJECTION, SOLUTION INTRAVENOUS at 11:06:00

## 2022-11-30 RX ADMIN — ONDANSETRON 4 MG: 2 INJECTION INTRAMUSCULAR; INTRAVENOUS at 11:40:00

## 2022-11-30 RX ADMIN — KETOROLAC TROMETHAMINE 30 MG: 30 INJECTION, SOLUTION INTRAMUSCULAR; INTRAVENOUS at 11:40:00

## 2022-11-30 RX ADMIN — DEXAMETHASONE SODIUM PHOSPHATE 10 MG: 4 MG/ML VIAL (ML) INJECTION at 11:21:00

## 2022-11-30 RX ADMIN — LIDOCAINE HYDROCHLORIDE 100 MG: 10 INJECTION, SOLUTION EPIDURAL; INFILTRATION; INTRACAUDAL; PERINEURAL at 11:09:00

## 2022-11-30 NOTE — DISCHARGE INSTRUCTIONS
Ok to blow nose   Use saline 3 times per day   And tylenol or motrin for pain    No diet restriction     You received a drug called Toradol which is an Anti Inflammatory at: 11:40AM  If you are allowed to take Anti Inflammatories:    Do not take any Anti Inflammatory like Motrin, Aleve, or Ibuprofen until after: 5:40PM    Please report any suspected allergic reactions or bleeding issues to your doctor

## 2022-11-30 NOTE — ANESTHESIA POSTPROCEDURE EVALUATION
[address] Patient Status:  Hospital Outpatient Surgery   Age/Gender 38year old male MRN [id number]   [facility] SURGERY Attending Kacey Robles MD   Hosp Day # 0 PCP Amy Chow DO       Anesthesia Post-op Note    BILATERAL MAXILLARY ANTROSTOMY AND BILATERAL TURBINATE REDUCTION    Procedure Summary     Date: 11/30/22 Room / Location: [facility] MAIN OR 02 / [facility] MAIN OR    Anesthesia Start: 8156 Anesthesia Stop:     Procedure: BILATERAL MAXILLARY ANTROSTOMY AND BILATERAL TURBINATE REDUCTION (Bilateral: Nose) Diagnosis: (NASAL TURBINATE HYPERTROPHY; CHRONIC MAXILLARY SINUSITIS; SEASONAL ALLERGIC RHINITIS DUE TO POLLEN)    Surgeons: Kacey Robles MD Anesthesiologist: Amy Madrigal MD    Anesthesia Type: general ASA Status: 1          Anesthesia Type: general    Vitals Value Taken Time   /90 11/30/22 1154   Temp 97.5 11/30/22 1154   Pulse 82 11/30/22 1154   Resp 20 11/30/22 1154   SpO2 100 11/30/22 1154       Patient Location: PACU    Anesthesia Type: general    Airway Patency: patent    Postop Pain Control: adequate    Mental Status: mildly sedated but able to meaningfully participate in the post-anesthesia evaluation    Nausea/Vomiting: none    Cardiopulmonary/Hydration status: stable euvolemic    Complications: no apparent anesthesia related complications    Postop vital signs: stable    Comments: signout given to PACU DUANE Mckeon    Dental Exam: Unchanged from Preop    Patient to be discharged from PACU when criteria met.

## 2022-11-30 NOTE — ANESTHESIA PROCEDURE NOTES
Airway  Date/Time: 11/30/2022 11:12 AM  Urgency: elective    Airway not difficult    General Information and Staff    Patient location during procedure: OR  Anesthesiologist: Davion Nazario MD  Performed: anesthesiologist     Indications and Patient Condition  Indications for airway management: anesthesia  Spontaneous Ventilation: absent  Sedation level: deep  Preoxygenated: yes  Patient position: sniffing  Mask difficulty assessment: 1 - vent by mask    Final Airway Details  Final airway type: endotracheal airway      Successful airway: ETT  Cuffed: yes   Successful intubation technique: direct laryngoscopy  Facilitating devices/methods: intubating stylet and cricoid pressure  Endotracheal tube insertion site: oral  Blade: Roger  Blade size: #3  ETT size (mm): 7.5    Cormack-Lehane Classification: grade I - full view of glottis  Placement verified by: chest auscultation and capnometry   Measured from: lips  Number of attempts at approach: 1  Number of other approaches attempted: 0    Additional Comments  Smooth induction. Eyes taped after induction, prior to intubation. Uncomplicated, successful intubation. Dentition same as previous, atraumatic.

## 2022-11-30 NOTE — BRIEF OP NOTE
Pre-Operative Diagnosis: NASAL TURBINATE HYPERTROPHY; CHRONIC MAXILLARY SINUSITIS; SEASONAL ALLERGIC RHINITIS DUE TO POLLEN     Post-Operative Diagnosis: NASAL TURBINATE HYPERTROPHY; CHRONIC MAXILLARY SINUSITIS; SEASONAL ALLERGIC RHINITIS DUE TO POLLEN      Procedure Performed:   BILATERAL MAXILLARY ANTROSTOMY AND BILATERAL TURBINATE REDUCTION    Surgeon(s) and Role:     Coty Prajapati MD - Primary    Assistant(s):        Surgical Findings: large cyst on left max and turb swelling      Specimen: to path      Estimated Blood Loss: Blood Output: 5 mL (11/30/2022 11:49 AM)      Dictation Number:      Carolina Mora MD  11/30/2022  11:55 AM

## 2022-11-30 NOTE — OPERATIVE REPORT
[address]    PATIENT'S NAME: Mynor Olivia   ATTENDING PHYSICIAN: Sandra Puckett M.D. OPERATING PHYSICIAN: Sandra Puckett M.D. PATIENT ACCOUNT#:   [de-identified]    LOCATION:  [facility] 16 EDW 10  MEDICAL RECORD #:   [id number]       YOB: 1980  ADMISSION DATE:       11/30/2022      OPERATION DATE:  11/30/2022    OPERATIVE REPORT    PREOPERATIVE DIAGNOSIS:  Chronic sinusitis and turbinate hypertrophy. POSTOPERATIVE DIAGNOSIS:  Chronic sinusitis and turbinate hypertrophy. PROCEDURE:  Bilateral maxillary antrostomies, as well as bilateral inferior turbinate reduction. ANESTHETIC:  General.    COMPLICATIONS:  None. ESTIMATED BLOOD LOSS:  Approximately 15 mL. HISTORY:  This is a 40-year-old male with persistent chronic sinus issues and recommended to undergo the above operation. Risks, benefits including bleeding, infection, possible recurrence, need for additional surgery in the future, persistent sinus infection, eye injury, brain injury, brain fluid leak. Patient signed the consent form. PROCEDURE:  The patient was brought to the operating room and placed in supine position. Given a general anesthetic via mask inhalation. Patient intubated. Topical Afrin was placed intranasally. Lidocaine 1% with epinephrine was injected into the inferior turbinates and middle turbinate. Using a 0-degree and 45 degree telescope, the left side was approached first and finding of what appeared to be an early antral choanal polyp. This was taken down and the maxillary antrostomy site was widened using straight biting forceps, as well as curved suction. The patient had a large cystic mass taken out from this area followed by thick mucoid material on the left side. Specimen was sent to Pathology. The patient's right side was inspected showing evidence of an ostiomeatal complex disease and swelling.   This was taken down with straight biting forceps and the maxillary antrostomy site was opened widely with curved suction and straight biting forceps. Patient tolerated procedure very well. The inferior turbinates were then outfractured and underwent submucosal reduction in the anterior, posterior, and middle portion of the inferior turbinate. This was tolerated. No nose packing was needed. The patient was awoken from anesthetic, brought to recovery in stable condition.      Dictated By Delmar Somers M.D.  d: 11/30/2022 11:58:14  t: 11/30/2022 15:47:24  [facility] [id number]  [facility]/

## 2023-12-04 ENCOUNTER — APPOINTMENT (OUTPATIENT)
Dept: CT IMAGING | Age: 43
End: 2023-12-04
Attending: EMERGENCY MEDICINE

## 2023-12-04 ENCOUNTER — HOSPITAL ENCOUNTER (EMERGENCY)
Age: 43
Discharge: HOME | End: 2023-12-04
Attending: EMERGENCY MEDICINE

## 2023-12-04 ENCOUNTER — APPOINTMENT (OUTPATIENT)
Dept: GENERAL RADIOLOGY | Age: 43
End: 2023-12-04
Attending: EMERGENCY MEDICINE

## 2023-12-04 VITALS
HEART RATE: 88 BPM | OXYGEN SATURATION: 95 % | SYSTOLIC BLOOD PRESSURE: 137 MMHG | HEIGHT: 71 IN | BODY MASS INDEX: 28.7 KG/M2 | WEIGHT: 205 LBS | RESPIRATION RATE: 18 BRPM | TEMPERATURE: 98.1 F | DIASTOLIC BLOOD PRESSURE: 93 MMHG

## 2023-12-04 DIAGNOSIS — J06.9 VIRAL UPPER RESPIRATORY TRACT INFECTION: Primary | ICD-10-CM

## 2023-12-04 DIAGNOSIS — R51.9 SINUS HEADACHE: ICD-10-CM

## 2023-12-04 LAB
ALBUMIN SERPL-MCNC: 3.9 G/DL (ref 3.6–5.1)
ALBUMIN/GLOB SERPL: 1.1 {RATIO} (ref 1–2.4)
ALP SERPL-CCNC: 73 UNITS/L (ref 45–117)
ALT SERPL-CCNC: 33 UNITS/L
ANION GAP SERPL CALC-SCNC: 9 MMOL/L (ref 7–19)
AST SERPL-CCNC: 15 UNITS/L
BASOPHILS # BLD: 0.1 K/MCL (ref 0–0.3)
BASOPHILS NFR BLD: 1 %
BILIRUB SERPL-MCNC: 0.6 MG/DL (ref 0.2–1)
BUN SERPL-MCNC: 12 MG/DL (ref 6–20)
BUN/CREAT SERPL: 14 (ref 7–25)
CALCIUM SERPL-MCNC: 9.4 MG/DL (ref 8.4–10.2)
CHLORIDE SERPL-SCNC: 107 MMOL/L (ref 97–110)
CO2 SERPL-SCNC: 27 MMOL/L (ref 21–32)
CREAT SERPL-MCNC: 0.84 MG/DL (ref 0.67–1.17)
DEPRECATED RDW RBC: 44.3 FL (ref 39–50)
EGFRCR SERPLBLD CKD-EPI 2021: >90 ML/MIN/{1.73_M2}
EOSINOPHIL # BLD: 0.2 K/MCL (ref 0–0.5)
EOSINOPHIL NFR BLD: 1 %
ERYTHROCYTE [DISTWIDTH] IN BLOOD: 12.8 % (ref 11–15)
FASTING DURATION TIME PATIENT: ABNORMAL H
FLUAV RNA RESP QL NAA+PROBE: NOT DETECTED
FLUBV RNA RESP QL NAA+PROBE: NOT DETECTED
GLOBULIN SER-MCNC: 3.4 G/DL (ref 2–4)
GLUCOSE SERPL-MCNC: 138 MG/DL (ref 70–99)
HCT VFR BLD CALC: 46.3 % (ref 39–51)
HGB BLD-MCNC: 15.1 G/DL (ref 13–17)
IMM GRANULOCYTES # BLD AUTO: 0 K/MCL (ref 0–0.2)
IMM GRANULOCYTES # BLD: 0 %
LYMPHOCYTES # BLD: 1 K/MCL (ref 1–4.8)
LYMPHOCYTES NFR BLD: 8 %
MCH RBC QN AUTO: 30.7 PG (ref 26–34)
MCHC RBC AUTO-ENTMCNC: 32.6 G/DL (ref 32–36.5)
MCV RBC AUTO: 94.1 FL (ref 78–100)
MONOCYTES # BLD: 1 K/MCL (ref 0.3–0.9)
MONOCYTES NFR BLD: 8 %
NEUTROPHILS # BLD: 10.9 K/MCL (ref 1.8–7.7)
NEUTROPHILS NFR BLD: 82 %
NRBC BLD MANUAL-RTO: 0 /100 WBC
PLATELET # BLD AUTO: 238 K/MCL (ref 140–450)
POTASSIUM SERPL-SCNC: 4.2 MMOL/L (ref 3.4–5.1)
PROT SERPL-MCNC: 7.3 G/DL (ref 6.4–8.2)
RAINBOW EXTRA TUBES HOLD SPECIMEN: NORMAL
RBC # BLD: 4.92 MIL/MCL (ref 4.5–5.9)
RSV AG NPH QL IA.RAPID: NOT DETECTED
SARS-COV-2 RNA RESP QL NAA+PROBE: NOT DETECTED
SERVICE CMNT-IMP: NORMAL
SERVICE CMNT-IMP: NORMAL
SODIUM SERPL-SCNC: 139 MMOL/L (ref 135–145)
WBC # BLD: 13.2 K/MCL (ref 4.2–11)

## 2023-12-04 PROCEDURE — 85025 COMPLETE CBC W/AUTO DIFF WBC: CPT | Performed by: EMERGENCY MEDICINE

## 2023-12-04 PROCEDURE — 10002800 HB RX 250 W HCPCS: Performed by: EMERGENCY MEDICINE

## 2023-12-04 PROCEDURE — 10002807 HB RX 258: Performed by: EMERGENCY MEDICINE

## 2023-12-04 PROCEDURE — 70450 CT HEAD/BRAIN W/O DYE: CPT

## 2023-12-04 PROCEDURE — 80053 COMPREHEN METABOLIC PANEL: CPT | Performed by: EMERGENCY MEDICINE

## 2023-12-04 PROCEDURE — 0241U COVID/FLU/RSV PANEL: CPT | Performed by: EMERGENCY MEDICINE

## 2023-12-04 PROCEDURE — 71045 X-RAY EXAM CHEST 1 VIEW: CPT

## 2023-12-04 RX ORDER — DIPHENHYDRAMINE HYDROCHLORIDE 50 MG/ML
12.5 INJECTION INTRAMUSCULAR; INTRAVENOUS ONCE
Status: COMPLETED | OUTPATIENT
Start: 2023-12-04 | End: 2023-12-04

## 2023-12-04 RX ORDER — AMOXICILLIN 500 MG/1
500 TABLET, FILM COATED ORAL 2 TIMES DAILY
Qty: 14 TABLET | Refills: 0 | Status: SHIPPED | OUTPATIENT
Start: 2023-12-04 | End: 2023-12-11

## 2023-12-04 RX ORDER — PROCHLORPERAZINE EDISYLATE 5 MG/ML
10 INJECTION INTRAMUSCULAR; INTRAVENOUS ONCE
Status: COMPLETED | OUTPATIENT
Start: 2023-12-04 | End: 2023-12-04

## 2023-12-04 RX ORDER — FEXOFENADINE HCL AND PSEUDOEPHEDRINE HCI 60; 120 MG/1; MG/1
1 TABLET, EXTENDED RELEASE ORAL 2 TIMES DAILY
Qty: 14 TABLET | Refills: 0 | Status: SHIPPED | OUTPATIENT
Start: 2023-12-04 | End: 2023-12-11

## 2023-12-04 RX ADMIN — SODIUM CHLORIDE 1000 ML: 9 INJECTION, SOLUTION INTRAVENOUS at 08:35

## 2023-12-04 RX ADMIN — DIPHENHYDRAMINE HYDROCHLORIDE 12.5 MG: 50 INJECTION, SOLUTION INTRAMUSCULAR; INTRAVENOUS at 08:37

## 2023-12-04 RX ADMIN — PROCHLORPERAZINE EDISYLATE 10 MG: 5 INJECTION INTRAMUSCULAR; INTRAVENOUS at 08:38

## 2024-03-17 NOTE — PLAN OF CARE
Received pt a/ox4, RA, NSR on tele at 0700  Pt pain relieved with toradol  IV Abx discontinued  Should be receiving solumedrol dose tomorrow morning than possible discharge after  Pt updated on plan of care and will continue to monitor    Problem: PAIN - A Yes

## (undated) DEVICE — BLADE 1883519HR RAD90 3PK M4 3.5MM ROTAT: Brand: RAD

## (undated) DEVICE — SLEEVE KENDALL SCD EXPRESS MED

## (undated) DEVICE — BLADE 1884004 TRICUT 5PK 4MM: Brand: TRICUT®

## (undated) DEVICE — SINUS CDS: Brand: MEDLINE INDUSTRIES, INC.

## (undated) DEVICE — TUBING 1895522 5PK STRAIGHTSHOT TO XPS: Brand: STRAIGHTSHOT®

## (undated) DEVICE — TURBINATOR WAND: Brand: COBLATION

## (undated) DEVICE — SYRINGE 3ML LL TIP

## (undated) DEVICE — STERILE SYNTHETIC POLYISOPRENE POWDER-FREE SURGICAL GLOVES WITH HYDROGEL COATING, SMOOTH FINISH, STRAIGHT FINGER: Brand: PROTEXIS

## (undated) DEVICE — SOLUTION  .9 1000ML BTL